# Patient Record
Sex: FEMALE | Race: WHITE | NOT HISPANIC OR LATINO | URBAN - NONMETROPOLITAN AREA
[De-identification: names, ages, dates, MRNs, and addresses within clinical notes are randomized per-mention and may not be internally consistent; named-entity substitution may affect disease eponyms.]

---

## 2018-01-18 ENCOUNTER — OFFICE VISIT CONVERTED (OUTPATIENT)
Dept: FAMILY MEDICINE CLINIC | Facility: CLINIC | Age: 38
End: 2018-01-18
Attending: NURSE PRACTITIONER

## 2018-06-29 ENCOUNTER — OFFICE VISIT CONVERTED (OUTPATIENT)
Dept: FAMILY MEDICINE CLINIC | Facility: CLINIC | Age: 38
End: 2018-06-29
Attending: NURSE PRACTITIONER

## 2019-06-11 ENCOUNTER — OFFICE VISIT CONVERTED (OUTPATIENT)
Dept: FAMILY MEDICINE CLINIC | Facility: CLINIC | Age: 39
End: 2019-06-11
Attending: FAMILY MEDICINE

## 2019-06-11 ENCOUNTER — HOSPITAL ENCOUNTER (OUTPATIENT)
Dept: GENERAL RADIOLOGY | Facility: HOSPITAL | Age: 39
Discharge: HOME | End: 2019-06-11
Attending: FAMILY MEDICINE

## 2019-06-21 ENCOUNTER — OFFICE VISIT CONVERTED (OUTPATIENT)
Dept: FAMILY MEDICINE CLINIC | Facility: CLINIC | Age: 39
End: 2019-06-21
Attending: NURSE PRACTITIONER

## 2019-06-21 ENCOUNTER — HOSPITAL ENCOUNTER (OUTPATIENT)
Dept: GENERAL RADIOLOGY | Facility: HOSPITAL | Age: 39
Discharge: HOME | End: 2019-06-21
Attending: NURSE PRACTITIONER

## 2019-06-21 LAB
25(OH)D3 SERPL-MCNC: 8.9 NG/ML (ref 30–100)
ALBUMIN SERPL-MCNC: 3.2 G/DL (ref 3.5–5)
ALBUMIN/GLOB SERPL: 0.8 {RATIO} (ref 1.4–2.6)
ALP SERPL-CCNC: 73 U/L (ref 42–98)
ALT SERPL-CCNC: 21 U/L (ref 10–40)
ANION GAP SERPL CALC-SCNC: 18 MMOL/L (ref 8–19)
AST SERPL-CCNC: 34 U/L (ref 15–50)
BASOPHILS # BLD AUTO: 0.11 10*3/UL (ref 0–0.2)
BASOPHILS NFR BLD AUTO: 0.9 % (ref 0–3)
BILIRUB SERPL-MCNC: 0.57 MG/DL (ref 0.2–1.3)
BUN SERPL-MCNC: 16 MG/DL (ref 5–25)
BUN/CREAT SERPL: 11 {RATIO} (ref 6–20)
CALCIUM SERPL-MCNC: 9.3 MG/DL (ref 8.7–10.4)
CHLORIDE SERPL-SCNC: 100 MMOL/L (ref 99–111)
CHOLEST SERPL-MCNC: 130 MG/DL (ref 107–200)
CHOLEST/HDLC SERPL: 3.9 {RATIO} (ref 3–6)
CONV ABS IMM GRAN: 0.04 10*3/UL (ref 0–0.2)
CONV CO2: 24 MMOL/L (ref 22–32)
CONV IMMATURE GRAN: 0.3 % (ref 0–1.8)
CONV TOTAL PROTEIN: 7.3 G/DL (ref 6.3–8.2)
CREAT UR-MCNC: 1.46 MG/DL (ref 0.5–0.9)
DEPRECATED RDW RBC AUTO: 51.2 FL (ref 36.4–46.3)
EOSINOPHIL # BLD AUTO: 0.21 10*3/UL (ref 0–0.7)
EOSINOPHIL # BLD AUTO: 1.8 % (ref 0–7)
ERYTHROCYTE [DISTWIDTH] IN BLOOD BY AUTOMATED COUNT: 17.9 % (ref 11.7–14.4)
EST. AVERAGE GLUCOSE BLD GHB EST-MCNC: 108 MG/DL
FOLATE SERPL-MCNC: 8.4 NG/ML (ref 4.8–20)
GFR SERPLBLD BASED ON 1.73 SQ M-ARVRAT: 45 ML/MIN/{1.73_M2}
GLOBULIN UR ELPH-MCNC: 4.1 G/DL (ref 2–3.5)
GLUCOSE SERPL-MCNC: 119 MG/DL (ref 65–99)
HBA1C MFR BLD: 10.5 G/DL (ref 12–16)
HBA1C MFR BLD: 5.4 % (ref 3.5–5.7)
HCT VFR BLD AUTO: 35.5 % (ref 37–47)
HDLC SERPL-MCNC: 33 MG/DL (ref 40–60)
IRON SATN MFR SERPL: 6 % (ref 20–55)
IRON SERPL-MCNC: 24 UG/DL (ref 60–170)
LDLC SERPL CALC-MCNC: 72 MG/DL (ref 70–100)
LYMPHOCYTES # BLD AUTO: 1.4 10*3/UL (ref 1–5)
MCH RBC QN AUTO: 23.4 PG (ref 27–31)
MCHC RBC AUTO-ENTMCNC: 29.6 G/DL (ref 33–37)
MCV RBC AUTO: 79.2 FL (ref 81–99)
MONOCYTES # BLD AUTO: 1.51 10*3/UL (ref 0.2–1.2)
MONOCYTES NFR BLD AUTO: 12.9 % (ref 3–10)
NEUTROPHILS # BLD AUTO: 8.45 10*3/UL (ref 2–8)
NEUTROPHILS NFR BLD AUTO: 72.2 % (ref 30–85)
NRBC CBCN: 0 % (ref 0–0.7)
OSMOLALITY SERPL CALC.SUM OF ELEC: 288 MOSM/KG (ref 273–304)
PLATELET # BLD AUTO: 277 10*3/UL (ref 130–400)
PMV BLD AUTO: 10.8 FL (ref 9.4–12.3)
POTASSIUM SERPL-SCNC: 4.4 MMOL/L (ref 3.5–5.3)
RBC # BLD AUTO: 4.48 10*6/UL (ref 4.2–5.4)
SODIUM SERPL-SCNC: 138 MMOL/L (ref 135–147)
T4 FREE SERPL-MCNC: 1.2 NG/DL (ref 0.9–1.8)
TIBC SERPL-MCNC: 388 UG/DL (ref 245–450)
TRANSFERRIN SERPL-MCNC: 271 MG/DL (ref 250–380)
TRIGL SERPL-MCNC: 124 MG/DL (ref 40–150)
TSH SERPL-ACNC: 3.65 M[IU]/L (ref 0.27–4.2)
VARIANT LYMPHS NFR BLD MANUAL: 11.9 % (ref 20–45)
VIT B12 SERPL-MCNC: 372 PG/ML (ref 211–911)
VLDLC SERPL-MCNC: 25 MG/DL (ref 5–37)
WBC # BLD AUTO: 11.72 10*3/UL (ref 4.8–10.8)

## 2019-07-23 ENCOUNTER — OFFICE VISIT CONVERTED (OUTPATIENT)
Dept: FAMILY MEDICINE CLINIC | Facility: CLINIC | Age: 39
End: 2019-07-23
Attending: NURSE PRACTITIONER

## 2019-09-10 ENCOUNTER — OFFICE VISIT CONVERTED (OUTPATIENT)
Dept: FAMILY MEDICINE CLINIC | Facility: CLINIC | Age: 39
End: 2019-09-10
Attending: NURSE PRACTITIONER

## 2019-11-21 ENCOUNTER — OFFICE VISIT CONVERTED (OUTPATIENT)
Dept: FAMILY MEDICINE CLINIC | Facility: CLINIC | Age: 39
End: 2019-11-21
Attending: FAMILY MEDICINE

## 2019-12-04 ENCOUNTER — OFFICE VISIT CONVERTED (OUTPATIENT)
Dept: FAMILY MEDICINE CLINIC | Facility: CLINIC | Age: 39
End: 2019-12-04
Attending: NURSE PRACTITIONER

## 2019-12-04 ENCOUNTER — CONVERSION ENCOUNTER (OUTPATIENT)
Dept: FAMILY MEDICINE CLINIC | Facility: CLINIC | Age: 39
End: 2019-12-04

## 2020-02-05 ENCOUNTER — CONVERSION ENCOUNTER (OUTPATIENT)
Dept: FAMILY MEDICINE CLINIC | Facility: CLINIC | Age: 40
End: 2020-02-05

## 2020-02-05 ENCOUNTER — OFFICE VISIT CONVERTED (OUTPATIENT)
Dept: FAMILY MEDICINE CLINIC | Facility: CLINIC | Age: 40
End: 2020-02-05
Attending: NURSE PRACTITIONER

## 2020-11-23 ENCOUNTER — TELEMEDICINE CONVERTED (OUTPATIENT)
Dept: FAMILY MEDICINE CLINIC | Facility: CLINIC | Age: 40
End: 2020-11-23
Attending: NURSE PRACTITIONER

## 2021-02-02 ENCOUNTER — TELEPHONE CONVERTED (OUTPATIENT)
Dept: FAMILY MEDICINE CLINIC | Facility: CLINIC | Age: 41
End: 2021-02-02
Attending: NURSE PRACTITIONER

## 2021-05-13 NOTE — PROGRESS NOTES
"   Progress Note      Patient Name: Niesha Bravo   Patient ID: [id number]   Sex: Female   YOB: 1980    Primary Care Provider: Phani Plummer DO   Referring Provider: Phani Plummer DO    Visit Date: November 23, 2020    Provider: JESUS Lawrence   Location: [facility]   Location Address: [address] [place] [address]  [id number]   Location Phone: [phone]          Chief Complaint  · Cold sore, rash, blister on chin with drainage.      History Of Present Illness  Niesha Bravo is a 40 year old /White female who presents for evaluation and treatment of:   TELEHEALTH TELEPHONE VISIT  Niesha Bravo is a 40 year old /White female who is presenting for evaluation via telehealth telephone visit. Verbal consent obtained before beginning visit.   Provider spent 13 minutes with patient during telehealth visit.   The following staff were present during this visit: JESUS Lawrence   Past Medical History/Overview of Patient Symptoms     1 year ago. States recent difficulty sleeping. Sleeps approx. 5-7 hours per night. .\">Pt c/o cold sore outbreak x 4 days. Started with one blister on bottom lip, has now spread to a cluster of blisters on top lip. States a small amount of discharge. No previous treatment. Hx of cold sores. Denies fever, chills, HA, sore throat, cough, or other.     Pt request refill of Ambien. Takes sparingly. Last Rx >1 year ago. States recent difficulty sleeping. Sleeps approx. 5-7 hours per night. .       Past Medical History  Disease Name Date Onset Notes   Hyperlipidemia 06/18/2014 --    Hypertension 06/03/2014 --    Hypothyroidism 06/03/2014 --    Obesity 09/19/2014 --          Medication List  Name Date Started Instructions   30G x 8mm (1/3\") disposable needles 05/19/2017 to be used as needed with Saxenda injectable pen   Advair Diskus 250-50 mcg/dose inhalation blister with device 05/11/2020 inhale 1 puff by inhalation route 2 times " per day in the morning and evening approximately 12 hours apart   Aeroneb Go Nebulizer miscellaneous misc 12/04/2019 use as directed   albuterol sulfate 2.5 mg /3 mL (0.083 %) inhalation solution for nebulization 04/01/2020 NEB QID PRN   albuterol sulfate 90 mcg/actuation inhalation HFA aerosol inhaler 05/04/2020 2P PO Q4-6H PRN   Ambien 5 mg oral tablet 09/10/2019 take 1 tablet (5 mg) by oral route once daily at bedtime for 30 days   cetirizine 10 mg oral tablet 10/20/2020 TAKE 1 TABLET BY MOUTH ONCE DAILY   diclofenac sodium 50 mg oral tablet,delayed release (DR/EC) 05/05/2020 1T PO BID /F   Diovan -25 mg oral tablet 07/23/2019 take 1 tablet by oral route once daily for 30 days   ergocalciferol (vitamin D2) 1,250 mcg (50,000 unit) oral capsule 10/20/2020 TAKE 1 CAPSULE (50,000 UNIT) BY ORAL ROUTE ONCE WEEKLY   ferrous sulfate 325 mg (65 mg iron) oral tablet 05/04/2020 1T PO BID   fluticasone propion-salmeterol 250-50 mcg/dose inhalation blister with device 10/20/2020 ONE PUFF BY MOUTH TWICE DAILY EVERY MORNING & EVERY EVENING   fluticasone propionate 0.005 % topical ointment 07/09/2020 apply a thin layer to the affected area(s) by topical route 2 times per day ; rub in gently and completely   ipratropium-albuterol 0.5 mg-3 mg(2.5 mg base)/3 mL inhalation solution for nebulization 12/04/2019 USE ONE VIAL IN THE NEBULIZER TWICE DAILY FOR7 DAYS   labetalol 200 mg oral tablet 05/04/2020 1T PO BID   levothyroxine 150 mcg oral tablet 05/04/2020 1T PO QAM OES   nystatin 100,000 unit/mL oral suspension 06/12/2020 1TSP PO QID   Reusable Nebulizer Kit miscellaneous kit 09/10/2019 use as directed   ropinirole 1 mg oral tablet 02/05/2020 take 1 tablet (1 mg) by oral route 1-3 hours before bedtime for 30 days   valsartan-hydrochlorothiazide 320-25 mg oral tablet 04/01/2020 1T PO QD   Ventolin HFA 90 mcg/actuation inhalation HFA aerosol inhaler 07/23/2019 INHALE 2 PUFFS BY MOUTH EVERY 4 TO 6 HOURS AS NEEDED for 30 days    Vitamin D2 50,000 unit oral capsule 10/14/2019 take 1 capsule (50,000 unit) by oral route once weekly for 30 days         Allergy List  Allergen Name Date Reaction Notes   NO KNOWN DRUG ALLERGIES --  --  --        Allergies Reconciled  Social History  Finding Status Start/Stop Quantity Notes   Active but no formal exercise --  --/-- --  --    Alcohol Never --/-- --  --    Denies illicit substance abuse --  --/-- --  --    Denies substance abuse --  --/-- --  --    Tobacco Never --/-- --  --          Review of Systems  · Constitutional  o Admits  o : insomnia  o Denies  o : fever, fatigue, weight loss, weight gain  · HENT  o Admits  o : oral lesions  · Cardiovascular  o Denies  o : lower extremity edema, claudication, chest pressure, palpitations  · Respiratory  o Denies  o : shortness of breath, wheezing, cough, hemoptysis, dyspnea on exertion  · Gastrointestinal  o Denies  o : nausea, vomiting, diarrhea, constipation, abdominal pain  · Psychiatric  o Denies  o : anxiety, depression, suicidal ideation, homicidal ideation          Assessment  · Insomnia, unspecified     780.52/G47.00  · Oral lesion     528.9/K13.70  · Herpes simplex type 1 infection     054.9/B00.9       HSV-1 outbreak:  acyclovir 200mg PO 5x per day x 5 days  mupirocin ointment apply bid prn   Keep area clean and dry  Tyl/Ibu q4-6hrs UAD prn     Insomnia:  Refilled Ambien 5mg PO qhs PRN  Constantine reviewed and appropriate  Discussed all med SE/AEs including all BB warnings, pt will DC immed and notify office if these occur  Discussed proper sleep hygiene       Plan  · Orders  o CONSTANTINE Report (KASPR) - - 11/23/2020  o ACO-39: Current medications updated and reviewed (, 8421F) - - 11/23/2020  o Physician Telephone Evaluation, 11-20 minutes (42042) - - 11/23/2020  · Medications  o acyclovir 200 mg oral capsule   SIG: take 1 capsule (200 mg) by oral route 5 times per day for 5 days   DISP: (25) Capsule with 0 refills  Prescribed on 11/23/2020      o mupirocin 2 % topical ointment   SIG: apply a small amount to the affected area by topical route 3 times per day   DISP: (1) Tube with 0 refills  Prescribed on 11/23/2020     o Ambien 5 mg oral tablet   SIG: take 1 tablet (5 mg) by oral route once daily at bedtime for 30 days   DISP: (30) Tablet with 1 refills  Refilled on 11/23/2020     o Medications have been Reconciled  o Transition of Care or Provider Policy  · Instructions  o Avoid any electronic use for at least 30 minutes prior to bed time. Cell phone screens, tablets and TVs imitate daylight, so your brain can become confused on the time of day. No caffeine use in the late afternoon and evenings.  o Obtained a written consent for CONSTANTINE query. Discussed the risk and benefits of the use of controlled substances with the patient, including the risk of tolerance and drug dependence. The patient has been counseled on the need to have an exit strategy, including potentially discontinuing the use of controlled substances. CONSTANTINE has or will be reviewed as soon as it becomes avaliable.  o Take all medications as prescribed/directed.  o Rest. Increase Fluids.  o Patient was educated/instructed on their diagnosis, treatment and medications prior to discharge from the clinic today.  o Patient instructed to seek medical attention urgently for new or worsening symptoms.  o Call the office with any concerns or questions.  o Bring all medicines with their bottles to each office visit.  o Risks, benefits, and alternatives were discussed with the patient. The patient is aware of risks associated with: all meds and conditions.   o Chronic conditions reviewed and taken into consideration for today's treatment plan.  o Plan Of Care: HSV-1  · Disposition  o Call or Return if symptoms worsen or persist.  o Follow Up PRN.  o Proceed to ED for all medical emergencies.            Electronically Signed by: JESUS Lawrence -Author on November 23, 2020 12:06:07 PM

## 2021-05-14 NOTE — PROGRESS NOTES
"   Progress Note      Patient Name: Niesha Bravo   Patient ID: [id number]   Sex: Female   YOB: 1980    Primary Care Provider: Phani Plummer DO   Referring Provider: Phani Plummer DO    Visit Date: February 2, 2021    Provider: JESUS Lawrence   Location: [facility]   Location Address: [address] [place] [address]  [id number]   Location Phone: [phone]          Chief Complaint  · Coughing x3 days.  · Hurts when taking a deep breath.      History Of Present Illness  TELEHEALTH TELEPHONE VISIT  Niesha Bravo is a 40 year old /White female who is presenting for evaluation via telehealth telephone visit. Verbal consent obtained before beginning visit.   Provider spent 14 minutes with patient during telehealth visit.   The following staff were present during this visit: JESUS Lawrence   Past Medical History/Overview of Patient Symptoms  Niesha Bravo is a 40 year old /White female who presents for evaluation and treatment of:      Pt c/o productive cough, nasal congestion (yellow, green), chest congestion x 5 days. Denies fever, chills, sore throat, SOB, myalgia, sudden loss of taste or smell, HA. Denies any recent travel or sick contacts. Has not taken anything to treat.       Past Medical History  Disease Name Date Onset Notes   Hyperlipidemia 06/18/2014 --    Hypertension 06/03/2014 --    Hypothyroidism 06/03/2014 --    Obesity 09/19/2014 --          Medication List  Name Date Started Instructions   30G x 8mm (1/3\") disposable needles 05/19/2017 to be used as needed with Saxenda injectable pen   acyclovir 200 mg oral capsule 11/24/2020 take 1 capsule (200 mg) by oral route 5 times per day for 5 days   Advair Diskus 250-50 mcg/dose inhalation blister with device 05/11/2020 inhale 1 puff by inhalation route 2 times per day in the morning and evening approximately 12 hours apart   Aeroneb Go Nebulizer miscellaneous misc 12/04/2019 use as directed "   albuterol sulfate 2.5 mg /3 mL (0.083 %) inhalation solution for nebulization 04/01/2020 NEB QID PRN   albuterol sulfate 90 mcg/actuation inhalation HFA aerosol inhaler 05/04/2020 2P PO Q4-6H PRN   Ambien 5 mg oral tablet 11/23/2020 take 1 tablet (5 mg) by oral route once daily at bedtime for 30 days   cetirizine 10 mg oral tablet 10/20/2020 TAKE 1 TABLET BY MOUTH ONCE DAILY   diclofenac sodium 50 mg oral tablet,delayed release (DR/EC) 05/05/2020 1T PO BID /F   Diovan -25 mg oral tablet 07/23/2019 take 1 tablet by oral route once daily for 30 days   ergocalciferol (vitamin D2) 1,250 mcg (50,000 unit) oral capsule 10/20/2020 TAKE 1 CAPSULE (50,000 UNIT) BY ORAL ROUTE ONCE WEEKLY   ferrous sulfate 325 mg (65 mg iron) oral tablet 12/09/2020 TAKE 1 TABLET BY MOUTH TWICE DAILY   fluticasone propion-salmeterol 250-50 mcg/dose inhalation blister with device 10/20/2020 ONE PUFF BY MOUTH TWICE DAILY EVERY MORNING & EVERY EVENING   fluticasone propionate 0.005 % topical ointment 07/09/2020 apply a thin layer to the affected area(s) by topical route 2 times per day ; rub in gently and completely   ipratropium-albuterol 0.5 mg-3 mg(2.5 mg base)/3 mL inhalation solution for nebulization 12/04/2019 USE ONE VIAL IN THE NEBULIZER TWICE DAILY FOR7 DAYS   labetalol 200 mg oral tablet 12/09/2020 TAKE 1 TABLET BY MOUTH TWICE DAILY   levothyroxine 150 mcg oral tablet 12/09/2020 TAKE 1 TABLET BY MOUTH EVERY MORNING TAKE ON EMPTY STOMACH   mupirocin 2 % topical ointment 11/24/2020 apply a small amount to the affected area by topical route 3 times per day   nystatin 100,000 unit/mL oral suspension 06/12/2020 1TSP PO QID   Reusable Nebulizer Kit miscellaneous kit 09/10/2019 use as directed   ropinirole 1 mg oral tablet 12/09/2020 TAKE 1 TABLET BY MOUTH ONE TO THREE HOURS BEFORE BEDTIME   valsartan-hydrochlorothiazide 320-25 mg oral tablet 12/09/2020 TAKE 1 TABLET BY MOUTH ONCE DAILY   Ventolin HFA 90 mcg/actuation inhalation HFA  aerosol inhaler 02/02/2021 INHALE 2 PUFFS BY MOUTH EVERY 4 TO 6 HOURS AS NEEDED for 30 days   Vitamin D2 1,250 mcg (50,000 unit) oral capsule 02/02/2021 take 1 capsule (50,000 unit) by oral route once weekly for 30 days for 30 days         Allergy List  Allergen Name Date Reaction Notes   NO KNOWN DRUG ALLERGIES --  --  --        Allergies Reconciled  Social History  Finding Status Start/Stop Quantity Notes   Active but no formal exercise --  --/-- --  --    Alcohol Never --/-- --  --    Denies illicit substance abuse --  --/-- --  --    Denies substance abuse --  --/-- --  --    Tobacco Never --/-- --  --          Review of Systems  · Constitutional  o Denies  o : fever, fatigue, weight loss, weight gain  · HENT  o Admits  o : nasal congestion, nasal discharge  o Denies  o : headaches, sore throat  · Cardiovascular  o Denies  o : lower extremity edema, claudication, chest pressure, palpitations  · Respiratory  o Admits  o : cough, chest congestion  o Denies  o : shortness of breath, wheezing, hemoptysis, dyspnea on exertion  · Gastrointestinal  o Denies  o : nausea, vomiting, diarrhea, constipation, abdominal pain  · Genitourinary  o Admits  o : incontinence  · Musculoskeletal  o Denies  o : muscle pain, back pain  · Psychiatric  o Denies  o : anxiety, depression, suicidal ideation, homicidal ideation              Assessment  · Allergic rhinitis due to allergen     477.9/J30.9  · Bronchitis, acute     466.0/J20.9  · Cough     786.2/R05  · Essential hypertension     401.9/I10  · Obesity     278.00/E66.9  · Incontinence     788.30/R32       Bronchitis, acute:  Augmentin 875mg PO bid x 7 days  Medrol dose Kwan UAD   Increase rest  Increase clear fluids  Continue all inhalers, neb treatments, and medications as prescribed  Avoid allergy/bronchitis triggers    Incontinence:  Discussed behavioral treatments including bladder retraining and prompted voiding as well as kegal exercises   advised weight loss, caffeine  reductions, fluid mgt  Start Oxybutinin 5mg PO bid   Order for adult diapers at patient request  Advised to referral to urology if S/S worsen or persist despite tx     Problems Reconciled  Plan  · Orders  o Physician Telephone Evaluation, 11-20 minutes (00372) - - 02/02/2021  o ACO-39: Current medications updated and reviewed (, 1159F) - - 02/02/2021  · Medications  o Augmentin 875-125 mg oral tablet   SIG: take 1 tablet by oral route every 12 hours for 7 days   DISP: (14) Tablet with 0 refills  Prescribed on 02/02/2021     o Medrol (Kwan) 4 mg oral tablets,dose pack   SIG: take as directed   DISP: (1) Packet with 0 refills  Prescribed on 02/02/2021     o Depend Underwear For Women XL miscellaneous misc   SIG: use as directed   DISP: (2) Package with 2 refills  Prescribed on 02/02/2021     o oxybutynin chloride 5 mg oral tablet   SIG: take 1 tablet (5 mg) by oral route 2 times per day for 30 days   DISP: (60) Tablet with 2 refills  Prescribed on 02/02/2021     o Medications have been Reconciled  o Transition of Care or Provider Policy  · Instructions  o Patient advised to monitor blood pressure (B/P) at home and journal readings. Patient informed that a B/P reading at home of more than 130/80 is considered hypertension. For readings greater eqrq344/90 or higher patient is advised to follow up in the office with readings for management. Patient advised to limit sodium intake.  o Plan Of Care: bronchitis, incontinence   o Chronic conditions reviewed and taken into consideration for today's treatment plan.  o Patient instructed to seek medical attention urgently for new or worsening symptoms.  o Patient was educated/instructed on their diagnosis, treatment and medications prior to discharge from the clinic today.  o Take all medications as prescribed/directed.  o Rest. Increase Fluids.  o Patient was instructed to exercise regularly.  o Call the office with any concerns or questions.  o Risks, benefits, and  alternatives were discussed with the patient. The patient is aware of risks associated with: all med and conditions.   o Discussed Covid-19 precautions including, but not limited to, social distancing, avoid touching your face, and hand washing.   o Bring all medicines with their bottles to each office visit.  · Disposition  o Call or Return if symptoms worsen or persist.  o Follow Up PRN.  o Proceed to ED for all medical emergencies.            Electronically Signed by: JESUS Lawrence -Author on February 2, 2021 01:45:18 PM

## 2021-05-15 VITALS
TEMPERATURE: 94.3 F | HEIGHT: 66 IN | OXYGEN SATURATION: 94 % | DIASTOLIC BLOOD PRESSURE: 39 MMHG | HEART RATE: 74 BPM | WEIGHT: 293 LBS | BODY MASS INDEX: 47.09 KG/M2 | SYSTOLIC BLOOD PRESSURE: 63 MMHG | RESPIRATION RATE: 16 BRPM

## 2021-05-15 VITALS
RESPIRATION RATE: 20 BRPM | OXYGEN SATURATION: 97 % | DIASTOLIC BLOOD PRESSURE: 95 MMHG | WEIGHT: 293 LBS | SYSTOLIC BLOOD PRESSURE: 188 MMHG | HEIGHT: 66 IN | BODY MASS INDEX: 47.09 KG/M2 | HEART RATE: 98 BPM | TEMPERATURE: 98 F

## 2021-05-15 VITALS
DIASTOLIC BLOOD PRESSURE: 79 MMHG | HEART RATE: 83 BPM | SYSTOLIC BLOOD PRESSURE: 124 MMHG | OXYGEN SATURATION: 97 % | RESPIRATION RATE: 20 BRPM | WEIGHT: 293 LBS | TEMPERATURE: 97.1 F | HEIGHT: 66 IN | BODY MASS INDEX: 47.09 KG/M2

## 2021-05-15 VITALS
OXYGEN SATURATION: 96 % | SYSTOLIC BLOOD PRESSURE: 101 MMHG | TEMPERATURE: 98.4 F | HEIGHT: 66 IN | BODY MASS INDEX: 47.09 KG/M2 | HEART RATE: 106 BPM | RESPIRATION RATE: 18 BRPM | WEIGHT: 293 LBS | DIASTOLIC BLOOD PRESSURE: 62 MMHG

## 2021-05-15 VITALS
DIASTOLIC BLOOD PRESSURE: 68 MMHG | TEMPERATURE: 97.9 F | RESPIRATION RATE: 20 BRPM | HEIGHT: 66 IN | SYSTOLIC BLOOD PRESSURE: 130 MMHG | HEART RATE: 90 BPM | WEIGHT: 293 LBS | OXYGEN SATURATION: 96 % | BODY MASS INDEX: 47.09 KG/M2

## 2021-05-15 VITALS
RESPIRATION RATE: 20 BRPM | HEART RATE: 87 BPM | OXYGEN SATURATION: 96 % | SYSTOLIC BLOOD PRESSURE: 106 MMHG | DIASTOLIC BLOOD PRESSURE: 72 MMHG | BODY MASS INDEX: 47.09 KG/M2 | HEIGHT: 66 IN | WEIGHT: 293 LBS | TEMPERATURE: 97.3 F

## 2021-05-15 VITALS
BODY MASS INDEX: 47.09 KG/M2 | HEART RATE: 68 BPM | WEIGHT: 293 LBS | TEMPERATURE: 97.5 F | DIASTOLIC BLOOD PRESSURE: 102 MMHG | RESPIRATION RATE: 20 BRPM | HEIGHT: 66 IN | SYSTOLIC BLOOD PRESSURE: 188 MMHG | OXYGEN SATURATION: 96 %

## 2021-05-16 VITALS
RESPIRATION RATE: 20 BRPM | SYSTOLIC BLOOD PRESSURE: 146 MMHG | OXYGEN SATURATION: 95 % | BODY MASS INDEX: 47.09 KG/M2 | TEMPERATURE: 97.1 F | HEIGHT: 66 IN | WEIGHT: 293 LBS | DIASTOLIC BLOOD PRESSURE: 89 MMHG | HEART RATE: 82 BPM

## 2021-05-16 VITALS
HEIGHT: 66 IN | SYSTOLIC BLOOD PRESSURE: 140 MMHG | DIASTOLIC BLOOD PRESSURE: 68 MMHG | BODY MASS INDEX: 47.09 KG/M2 | TEMPERATURE: 97.7 F | HEART RATE: 100 BPM | RESPIRATION RATE: 28 BRPM | OXYGEN SATURATION: 94 % | WEIGHT: 293 LBS

## 2021-05-19 ENCOUNTER — TELEMEDICINE CONVERTED (OUTPATIENT)
Dept: FAMILY MEDICINE CLINIC | Facility: CLINIC | Age: 41
End: 2021-05-19
Attending: NURSE PRACTITIONER

## 2021-06-05 NOTE — PROGRESS NOTES
"   Progress Note      Patient Name: Niesha Bravo   Patient ID: [id number]   Sex: Female   YOB: 1980    Primary Care Provider: Phani Plummer DO   Referring Provider: Phani Plummer DO    Visit Date: May 19, 2021    Provider: JESUS Lawrence   Location: [facility]   Location Address: [address] [place] [address]  [id number]   Location Phone: [phone]          Chief Complaint     patient being seen for cough/congestion       History Of Present Illness  Video Conferencing Visit  Niesha Bravo is a 41 year old /White female who is presenting for evaluation via video conferencing via Zoom. Verbal consent obtained before beginning visit.   The following staff were present during this visit: JESUS Lawrence, Alesia Juarez   Niesha Bravo is a 41 year old /White female who presents for evaluation and treatment of:      Pt presents with productive cough (yellow), nasal congestion, chest congestion, HA x 1 week. Has not taken anything to treat.       Past Medical History  Disease Name Date Onset Notes   Hyperlipidemia 06/18/2014 --    Hypertension 06/03/2014 --    Hypothyroidism 06/03/2014 --    Obesity 09/19/2014 --          Medication List  Name Date Started Instructions   30G x 8mm (1/3\") disposable needles 05/19/2017 to be used as needed with Saxenda injectable pen   acyclovir 200 mg oral capsule 03/30/2021 take 1 capsule (200 mg) by oral route 5 times per day for 5 days   Advair Diskus 250-50 mcg/dose inhalation blister with device 03/30/2021 inhale 1 puff by inhalation route 2 times per day in the morning and evening approximately 12 hours apart for 30 days   Aeroneb Go Nebulizer miscellaneous misc 12/04/2019 use as directed   albuterol sulfate 2.5 mg /3 mL (0.083 %) inhalation solution for nebulization 04/01/2020 NEB QID PRN   albuterol sulfate 90 mcg/actuation inhalation HFA aerosol inhaler 03/30/2021 2P PO Q4-6H PRN for 30 days   Ambien 5 mg oral " tablet 11/23/2020 take 1 tablet (5 mg) by oral route once daily at bedtime for 30 days   cetirizine 10 mg oral tablet 03/30/2021 TAKE 1 TABLET BY MOUTH ONCE DAILY   Depend Underwear For Women XL miscellaneous misc 03/30/2021 use as directed for 30 days   diclofenac sodium 50 mg oral tablet,delayed release (DR/EC) 03/30/2021 1T PO BID /F for 30 days   Diovan -25 mg oral tablet 03/30/2021 take 1 tablet by oral route once daily for 30 days   ergocalciferol (vitamin D2) 1,250 mcg (50,000 unit) oral capsule 10/20/2020 TAKE 1 CAPSULE (50,000 UNIT) BY ORAL ROUTE ONCE WEEKLY   ferrous sulfate 325 mg (65 mg iron) oral tablet 03/30/2021 TAKE 1 TABLET BY MOUTH TWICE DAILY   fluticasone propion-salmeterol 250-50 mcg/dose inhalation blister with device 03/30/2021 ONE PUFF BY MOUTH TWICE DAILY EVERY MORNING & EVERY EVENING   fluticasone propionate 0.005 % topical ointment 07/09/2020 apply a thin layer to the affected area(s) by topical route 2 times per day ; rub in gently and completely   ipratropium-albuterol 0.5 mg-3 mg(2.5 mg base)/3 mL inhalation solution for nebulization 03/30/2021 USE ONE VIAL IN THE NEBULIZER TWICE DAILY FOR7 DAYS for 30 days   labetalol 200 mg oral tablet 03/30/2021 TAKE 1 TABLET BY MOUTH TWICE DAILY   levothyroxine 150 mcg oral tablet 03/30/2021 TAKE 1 TABLET BY MOUTH EVERY MORNING TAKE ON EMPTY STOMACH   mupirocin 2 % topical ointment 11/24/2020 apply a small amount to the affected area by topical route 3 times per day   nystatin 100,000 unit/mL oral suspension 06/12/2020 1TSP PO QID   oxybutynin chloride 5 mg oral tablet 03/30/2021 take 1 tablet (5 mg) by oral route 2 times per day for 30 days   Reusable Nebulizer Kit miscellaneous kit 09/10/2019 use as directed   ropinirole 1 mg oral tablet 03/30/2021 TAKE 1 TABLET BY MOUTH ONE TO THREE HOURS BEFORE BEDTIME   valsartan-hydrochlorothiazide 320-25 mg oral tablet 03/30/2021 TAKE 1 TABLET BY MOUTH ONCE DAILY   Ventolin HFA 90 mcg/actuation  inhalation HFA aerosol inhaler 03/30/2021 INHALE 2 PUFFS BY MOUTH EVERY 4 TO 6 HOURS AS NEEDED for 30 days   Vitamin D2 1,250 mcg (50,000 unit) oral capsule 03/30/2021 take 1 capsule (50,000 unit) by oral route once weekly for 30 days for 30 days         Allergy List  Allergen Name Date Reaction Notes   NO KNOWN DRUG ALLERGIES --  --  --          Social History  Finding Status Start/Stop Quantity Notes   Active but no formal exercise --  --/-- --  --    Alcohol Never --/-- --  --    Denies illicit substance abuse --  --/-- --  --    Denies substance abuse --  --/-- --  --    Tobacco Never --/-- --  --          Review of Systems  · Constitutional  o Denies  o : fever, fatigue, weight loss, weight gain  · HENT  o Admits  o : headaches, nasal congestion, nasal discharge  · Cardiovascular  o Denies  o : lower extremity edema, claudication, chest pressure, palpitations  · Respiratory  o Admits  o : cough  o Denies  o : shortness of breath, wheezing, hemoptysis, dyspnea on exertion  · Gastrointestinal  o Denies  o : nausea, vomiting, diarrhea, constipation, abdominal pain  · Musculoskeletal  o Denies  o : muscle pain, back pain  · Psychiatric  o Denies  o : anxiety, depression, suicidal ideation, homicidal ideation      Physical Examination  · Constitutional  o Appearance  o : no acute distress, well-nourished  · Head and Face  o Head  o :   § Inspection  § : atraumatic, normocephalic  o Face  o :   § Inspection  § : no facial lesions  · Eyes  o Eyes  o : no scleral icterus, no conjunctival injection  · Respiratory  o Respiratory  o : breathing comfortably, symmetric chest rise  · Psychiatric  o General  o : normal mood and affect          Assessment  · Allergic rhinitis due to allergen     477.9/J30.9  · Bronchitis, acute     466.0/J20.9  · Obesity     278.00/E66.9       Bronchitis, acute:  cefdinir 300mg PO bid  7 days  Medrol dose vy UAD   Bromfed 10ml PO q4hrs PRN cough  Increase rest  Increase clear fluids  Avoid  allergy triggers       Plan  · Orders  o ACO-39: Current medications updated and reviewed (1159F, ) - - 05/19/2021  · Medications  o cefdinir 300 mg oral capsule   SIG: take 1 capsule (300 mg) by oral route every 12 hours for 7 days   DISP: (14) Capsule with 0 refills  Prescribed on 05/19/2021     o Medrol (Kwan) 4 mg oral tablets,dose pack   SIG: take by oral route as directed per package instructions   DISP: (1) Packet with 0 refills  Prescribed on 05/19/2021     o Bromfed DM 2-30-10 mg/5 mL oral syrup   SIG: take 10 milliliters by oral route every 4 hours   DISP: (118) Milliliter with 0 refills  Prescribed on 05/19/2021     o Medications have been Reconciled  o Transition of Care or Provider Policy  · Instructions  o Take all medications as prescribed/directed.  o Rest. Increase Fluids.  o Patient was educated/instructed on their diagnosis, treatment and medications prior to discharge from the clinic today.  o Patient instructed to seek medical attention urgently for new or worsening symptoms.  o Call the office with any concerns or questions.  o Bring all medicines with their bottles to each office visit.  o Risks, benefits, and alternatives were discussed with the patient. The patient is aware of risks associated with: all meds and conditions.   o Chronic conditions reviewed and taken into consideration for today's treatment plan.  o Discussed Covid-19 precautions including, but not limited to, social distancing, avoid touching your face, and hand washing.   · Disposition  o Call or Return if symptoms worsen or persist.  o Follow Up PRN.  o Proceed to ED for all medical emergencies.            Electronically Signed by: JESUS Lawrence -Author on May 19, 2021 05:13:38 PM

## 2022-03-30 ENCOUNTER — TELEMEDICINE (OUTPATIENT)
Dept: FAMILY MEDICINE CLINIC | Facility: CLINIC | Age: 42
End: 2022-03-30

## 2022-03-30 VITALS — HEIGHT: 66 IN | BODY MASS INDEX: 62.21 KG/M2

## 2022-03-30 DIAGNOSIS — Z13.1 SCREENING FOR DIABETES MELLITUS: ICD-10-CM

## 2022-03-30 DIAGNOSIS — E66.01 CLASS 3 SEVERE OBESITY WITH SERIOUS COMORBIDITY IN ADULT, UNSPECIFIED BMI, UNSPECIFIED OBESITY TYPE: ICD-10-CM

## 2022-03-30 DIAGNOSIS — I10 HYPERTENSION, UNSPECIFIED TYPE: ICD-10-CM

## 2022-03-30 DIAGNOSIS — J30.9 ALLERGIC RHINITIS, UNSPECIFIED SEASONALITY, UNSPECIFIED TRIGGER: ICD-10-CM

## 2022-03-30 DIAGNOSIS — E78.5 HYPERLIPIDEMIA, UNSPECIFIED HYPERLIPIDEMIA TYPE: ICD-10-CM

## 2022-03-30 DIAGNOSIS — R60.9 EDEMA, UNSPECIFIED TYPE: ICD-10-CM

## 2022-03-30 DIAGNOSIS — E55.9 VITAMIN D DEFICIENCY: ICD-10-CM

## 2022-03-30 DIAGNOSIS — D64.9 ANEMIA, UNSPECIFIED TYPE: ICD-10-CM

## 2022-03-30 DIAGNOSIS — Z11.59 NEED FOR HEPATITIS C SCREENING TEST: Primary | ICD-10-CM

## 2022-03-30 DIAGNOSIS — E03.9 HYPOTHYROIDISM, UNSPECIFIED TYPE: ICD-10-CM

## 2022-03-30 PROCEDURE — 99214 OFFICE O/P EST MOD 30 MIN: CPT | Performed by: NURSE PRACTITIONER

## 2022-03-30 RX ORDER — ROPINIROLE 1 MG/1
TABLET, FILM COATED ORAL
COMMUNITY
Start: 2022-03-18

## 2022-03-30 RX ORDER — LABETALOL 100 MG/1
200 TABLET, FILM COATED ORAL 2 TIMES DAILY
COMMUNITY
Start: 2022-03-18 | End: 2022-09-01 | Stop reason: SDUPTHER

## 2022-03-30 RX ORDER — ERGOCALCIFEROL 1.25 MG/1
50000 CAPSULE ORAL WEEKLY
COMMUNITY
Start: 2022-03-18 | End: 2022-07-07 | Stop reason: SDUPTHER

## 2022-03-30 RX ORDER — CETIRIZINE HYDROCHLORIDE 10 MG/1
TABLET ORAL
COMMUNITY
Start: 2022-03-18 | End: 2022-07-07 | Stop reason: SDUPTHER

## 2022-03-30 RX ORDER — VALSARTAN AND HYDROCHLOROTHIAZIDE 320; 25 MG/1; MG/1
1 TABLET, FILM COATED ORAL DAILY
COMMUNITY
Start: 2022-03-18 | End: 2022-09-01 | Stop reason: SDUPTHER

## 2022-03-30 RX ORDER — IPRATROPIUM BROMIDE AND ALBUTEROL SULFATE 2.5; .5 MG/3ML; MG/3ML
SOLUTION RESPIRATORY (INHALATION)
COMMUNITY
Start: 2021-03-30

## 2022-03-30 RX ORDER — FUROSEMIDE 20 MG/1
20 TABLET ORAL DAILY PRN
Qty: 30 TABLET | Refills: 1 | Status: SHIPPED | OUTPATIENT
Start: 2022-03-30

## 2022-03-30 RX ORDER — FERROUS SULFATE TAB EC 324 MG (65 MG FE EQUIVALENT) 324 (65 FE) MG
324 TABLET DELAYED RESPONSE ORAL 2 TIMES DAILY
COMMUNITY
Start: 2022-03-18

## 2022-03-30 RX ORDER — LEVOTHYROXINE SODIUM 0.15 MG/1
TABLET ORAL
COMMUNITY
Start: 2022-03-18 | End: 2022-07-07 | Stop reason: SDUPTHER

## 2022-03-30 NOTE — ASSESSMENT & PLAN NOTE
Patient's (Body mass index is 62.21 kg/m².) indicates that they are morbidly obese (BMI > 40 or > 35 with obesity - related health condition) with health conditions that include hypertension and dyslipidemias . Weight is unchanged. BMI is is above average; BMI management plan is completed. We discussed low calorie, low carb based diet program, portion control and increasing exercise.

## 2022-03-30 NOTE — PATIENT INSTRUCTIONS
Obesity, Adult  Obesity is having too much body fat. Being obese means that your weight is more than what is healthy for you.  BMI is a number that explains how much body fat you have. If you have a BMI of 30 or more, you are obese. Obesity is often caused by eating or drinking more calories than your body uses. Changing your lifestyle can help you lose weight.  Obesity can cause serious health problems, such as:  Stroke.  Coronary artery disease (CAD).  Type 2 diabetes.  Some types of cancer, including cancers of the colon, breast, uterus, and gallbladder.  Osteoarthritis.  High blood pressure (hypertension).  High cholesterol.  Sleep apnea.  Gallbladder stones.  Infertility problems.  What are the causes?  Eating meals each day that are high in calories, sugar, and fat.  Being born with genes that may make you more likely to become obese.  Having a medical condition that causes obesity.  Taking certain medicines.  Sitting a lot (having a sedentary lifestyle).  Not getting enough sleep.  Drinking a lot of drinks that have sugar in them.  What increases the risk?  Having a family history of obesity.  Being an  woman.  Being a  man.  Living in an area with limited access to:  [place], recreation centers, or sidewalks.  Healthy food choices, such as grocery stores and [employer].  What are the signs or symptoms?  The main sign is having too much body fat.  How is this treated?  Treatment for this condition often includes changing your lifestyle. Treatment may include:  Changing your diet. This may include making a healthy meal plan.  Exercise. This may include activity that causes your heart to beat faster (aerobic exercise) and strength training. Work with your doctor to design a program that works for you.  Medicine to help you lose weight. This may be used if you are not able to lose 1 pound a week after 6 weeks of healthy eating and more exercise.  Treating conditions that cause the  obesity.  Surgery. Options may include gastric banding and gastric bypass. This may be done if:  Other treatments have not helped to improve your condition.  You have a BMI of 40 or higher.  You have life-threatening health problems related to obesity.  Follow these instructions at home:  Eating and drinking    Follow advice from your doctor about what to eat and drink. Your doctor may tell you to:  Limit fast food, sweets, and processed snack foods.  Choose low-fat options. For example, choose low-fat milk instead of whole milk.  Eat 5 or more servings of fruits or vegetables each day.  Eat at home more often. This gives you more control over what you eat.  Choose healthy foods when you eat out.  Learn to read food labels. This will help you learn how much food is in 1 serving.  Keep low-fat snacks available.  Avoid drinks that have a lot of sugar in them. These include soda, fruit juice, iced tea with sugar, and flavored milk.  Drink enough water to keep your pee (urine) pale yellow.  Do not go on fad diets.    Physical activity  Exercise often, as told by your doctor. Most adults should get up to 150 minutes of moderate-intensity exercise every week.Ask your doctor:  What types of exercise are safe for you.  How often you should exercise.  Warm up and stretch before being active.  Do slow stretching after being active (cool down).  Rest between times of being active.  Lifestyle  Work with your doctor and a food expert (dietitian) to set a weight-loss goal that is best for you.  Limit your screen time.  Find ways to reward yourself that do not involve food.  Do not drink alcohol if:  Your doctor tells you not to drink.  You are pregnant, may be pregnant, or are planning to become pregnant.  If you drink alcohol:  Limit how much you use to:  0-1 drink a day for women.  0-2 drinks a day for men.  Be aware of how much alcohol is in your drink. In the [place] one drink equals one 12 oz bottle of beer (355 mL), one 5 oz  glass of wine (148 mL), or one 1½ oz glass of hard liquor (44 mL).  General instructions  Keep a weight-loss journal. This can help you keep track of:  The food that you eat.  How much exercise you get.  Take over-the-counter and prescription medicines only as told by your doctor.  Take vitamins and supplements only as told by your doctor.  Think about joining a support group.  Keep all follow-up visits as told by your doctor. This is important.  Contact a doctor if:  You cannot meet your weight loss goal after you have changed your diet and lifestyle for 6 weeks.  Get help right away if you:  Are having trouble breathing.  Are having thoughts of harming yourself.  Summary  Obesity is having too much body fat.  Being obese means that your weight is more than what is healthy for you.  Work with your doctor to set a weight-loss goal.  Get regular exercise as told by your doctor.  This information is not intended to replace advice given to you by your health care provider. Make sure you discuss any questions you have with your health care provider.  Document Revised: 08/22/2019 Document Reviewed: 08/22/2019  [employer] Patient Education © 2021 [employer]

## 2022-03-30 NOTE — PROGRESS NOTES
"Mode of Visit: Video  Location of patient: home  You have chosen to receive care through a telehealth visit.  Does the patient consent to use a video/audio connection for your medical care today? Yes  The visit included audio and video interaction. No technical issues occurred during this visit.     Chief Complaint  Edema (Left breast area and stomach)    Subjective          Niesha Bravo presents to [facility]  Reviewed all recent labs and medications. Pt has not had labs drawn since 2019. She has not been seen in office since 2/2020. Advised her next visit MUST be in office and she MUST come get labs done ASAP. Pt agrees and verbalizes understanding.     Pt c/o generalized edema, most pronounced in upper abd, left breast area. Denies pain, mass, N/V/D/C, rash, chest pain, or other. Pt with obesity, does c/o SOB. Requesting new nebulizer, states hers has stopped working.     Objective   Vital Signs:   Ht 167.6 cm (66\")   BMI 62.21 kg/m²     Physical Exam   Constitutional: She appears well-developed and well-nourished.   HENT:   Head: Normocephalic and atraumatic.   Eyes: Conjunctivae are normal.   Neck: Neck normal appearance.  Pulmonary/Chest: Effort normal.   Abdominal: She exhibits no distension and no visible mass. No visible hernia present.   Musculoskeletal:         General: Edema present.   Neurological: She is alert.   Skin: Skin is warm and dry.   Psychiatric: She has a normal mood and affect.   Vitals reviewed.    Result Review :                   Assessment and Plan    Diagnoses and all orders for this visit:    1. Need for hepatitis C screening test (Primary)  -     Hepatitis C Antibody; Future    2. Edema, unspecified type  Comments:  Lasix 20mg PO qd PRN   Recommend weight loss   Pt needs office visit for futher eval, consider imaging   Increase clear fluids     3. Hyperlipidemia, unspecified hyperlipidemia type  Assessment & Plan:  Lipid abnormalities are unknown, " order for labs today .  Nutritional counseling was provided. and Pharmacotherapy as ordered.  Lipids will be reassessed labs today .    Orders:  -     Lipid Panel; Future    4. Hypertension, unspecified type  Assessment & Plan:  Hypertension is unknown, advised that patient needs an inperson visit.  Dietary sodium restriction.  Weight loss.  Regular aerobic exercise.  Continue current medications.  Blood pressure will be reassessed in 2 weeks.      5. Hypothyroidism, unspecified type  Assessment & Plan:  Order for TSH   Continue levothyroxine as prescribed     Orders:  -     Comprehensive Metabolic Panel; Future  -     CBC & Differential; Future  -     TSH; Future    6. Class 3 severe obesity with serious comorbidity in adult, unspecified BMI, unspecified obesity type (HCC)  Assessment & Plan:  Patient's (Body mass index is 62.21 kg/m².) indicates that they are morbidly obese (BMI > 40 or > 35 with obesity - related health condition) with health conditions that include hypertension and dyslipidemias . Weight is unchanged. BMI is is above average; BMI management plan is completed. We discussed low calorie, low carb based diet program, portion control and increasing exercise.       7. Allergic rhinitis, unspecified seasonality, unspecified trigger  -     Home Nebulizer    8. Screening for diabetes mellitus  -     Hemoglobin A1c; Future    9. Vitamin D deficiency  -     Vitamin D 1,25 Dihydroxy; Future    10. Anemia, unspecified type  -     Iron and TIBC; Future    Other orders  -     furosemide (Lasix) 20 MG tablet; Take 1 tablet by mouth Daily As Needed (edema).  Dispense: 30 tablet; Refill: 1      Follow Up   Return in about 4 weeks (around 4/27/2022), or if symptoms worsen or fail to improve.  Patient was given instructions and counseling regarding her condition or for health maintenance advice. Please see specific information pulled into the AVS if appropriate.

## 2022-03-30 NOTE — ASSESSMENT & PLAN NOTE
Hypertension is unknown, advised that patient needs an inperson visit.  Dietary sodium restriction.  Weight loss.  Regular aerobic exercise.  Continue current medications.  Blood pressure will be reassessed in 2 weeks.

## 2022-03-30 NOTE — ASSESSMENT & PLAN NOTE
Lipid abnormalities are unknown, order for labs today .  Nutritional counseling was provided. and Pharmacotherapy as ordered.  Lipids will be reassessed labs today .

## 2022-04-04 ENCOUNTER — TELEMEDICINE (OUTPATIENT)
Dept: FAMILY MEDICINE CLINIC | Facility: CLINIC | Age: 42
End: 2022-04-04

## 2022-04-04 DIAGNOSIS — L23.2 ALLERGIC CONTACT DERMATITIS DUE TO COSMETICS: Primary | ICD-10-CM

## 2022-04-04 PROCEDURE — 99213 OFFICE O/P EST LOW 20 MIN: CPT

## 2022-04-04 RX ORDER — PREDNISONE 20 MG/1
TABLET ORAL
Qty: 8 TABLET | Refills: 0 | Status: SHIPPED | OUTPATIENT
Start: 2022-04-04 | End: 2022-04-14

## 2022-04-04 NOTE — PROGRESS NOTES
Today's encounter is being done with a telehealth visit. she has consented verbally with two witnesses for todays treatment. Todays visit is being conducted by audio and video. Individuals present during the telemedicine consultation include patient and JESUS Alejandre.    Chief Complaint   Patient presents with   • Rash     On scalp, ears and back of neck. She dyed her hair a couple of days ago, has blisters along her hairline on her forehead and ears. Thinks she is having an allergic reaction to the hair dye, has had this to happen before and was given steroids.        Subjective          Niesha Bravo presents to [facility]     She is being seen today for rash on her scalp, ears, back of neck, and forehead. She states she dyed her hair a couple days ago and ever since then she's had this rash start up. She has itching as well as blisters in her scalp.     She states this has happened one time before and had to take steroids for it. She was seen in the emergency room in [place] for this previously.       Past History:  Medical History: has a past medical history of Allergic, Hypertension, and Hyperthyroidism.   Surgical History: has no past surgical history on file.   Family History: family history is not on file.   Social History: reports that she has never smoked. She has never used smokeless tobacco. She reports that she does not drink alcohol and does not use drugs.  Allergies: Patient has no known allergies.  (Not in a hospital admission)       Social History     Socioeconomic History   • Marital status: Single   Tobacco Use   • Smoking status: Never Smoker   • Smokeless tobacco: Never Used   Vaping Use   • Vaping Use: Never used   Substance and Sexual Activity   • Alcohol use: Never   • Drug use: Never   • Sexual activity: Defer       Health Maintenance Due   Topic Date Due   • HEPATITIS C SCREENING  Never done   • LIPID PANEL  03/30/2022       Objective     Vital  Signs:   There were no vitals taken for this visit.      Physical Exam  Constitutional:       Appearance: Normal appearance.   HENT:      Nose: Nose normal.      Mouth/Throat:      Mouth: Mucous membranes are moist.   Skin:     Findings: Erythema (forehead) and rash (on forehead/scalp) present.   Neurological:      General: No focal deficit present.      Mental Status: She is alert and oriented to person, place, and time.   Psychiatric:         Mood and Affect: Mood normal.         Behavior: Behavior normal.          Review of Systems   Skin: Positive for rash.        Result Review :                 Assessment and Plan    Diagnoses and all orders for this visit:    1. Allergic contact dermatitis due to cosmetics (Primary)  -     predniSONE (DELTASONE) 20 MG tablet; Take 1 tablet by mouth Daily for 5 days, THEN 0.5 tablets Daily for 5 days.  Dispense: 8 tablet; Refill: 0    I am starting her on prednisone and tapering it down over 10 days to treat her allergic contact dermatitis.         Pt thought to be clinically stable at this time.    Follow Up   Return if symptoms worsen or fail to improve.  Patient was given instructions and counseling regarding her condition or for health maintenance advice. Please see specific information pulled into the AVS if appropriate.

## 2022-07-07 RX ORDER — ERGOCALCIFEROL 1.25 MG/1
50000 CAPSULE ORAL WEEKLY
Qty: 5 CAPSULE | Refills: 1 | Status: SHIPPED | OUTPATIENT
Start: 2022-07-07

## 2022-07-07 RX ORDER — LEVOTHYROXINE SODIUM 0.15 MG/1
150 TABLET ORAL DAILY
Qty: 90 TABLET | Refills: 1 | Status: SHIPPED | OUTPATIENT
Start: 2022-07-07 | End: 2023-03-14

## 2022-07-07 RX ORDER — CETIRIZINE HYDROCHLORIDE 10 MG/1
10 TABLET ORAL DAILY
Qty: 90 TABLET | Refills: 1 | Status: SHIPPED | OUTPATIENT
Start: 2022-07-07

## 2022-07-07 NOTE — TELEPHONE ENCOUNTER
Caller: Niesha Bravo    Relationship: Self    Best call back number: [phone]     Requested Prescriptions:   Requested Prescriptions     Pending Prescriptions Disp Refills   • cetirizine (zyrTEC) 10 MG tablet     • vitamin D (ERGOCALCIFEROL) 1.25 MG (35012 UT) capsule capsule 5 capsule      Sig: Take 1 capsule by mouth 1 (One) Time Per Week.   • levothyroxine (SYNTHROID, LEVOTHROID) 150 MCG tablet          Pharmacy where request should be sent: [address] [phone] [facility] [phone]      Additional details provided by patient: OUT OF MEDICATION     Does the patient have less than a 3 day supply:  [x] Yes  [] No    Ronal Copeland Rep   07/07/22 09:28 EDT

## 2022-09-01 RX ORDER — VALSARTAN AND HYDROCHLOROTHIAZIDE 320; 25 MG/1; MG/1
1 TABLET, FILM COATED ORAL DAILY
Qty: 90 TABLET | Refills: 1 | Status: SHIPPED | OUTPATIENT
Start: 2022-09-01 | End: 2023-03-14

## 2022-09-01 RX ORDER — LABETALOL 100 MG/1
200 TABLET, FILM COATED ORAL 2 TIMES DAILY
Qty: 180 TABLET | Refills: 1 | Status: SHIPPED | OUTPATIENT
Start: 2022-09-01 | End: 2023-03-14

## 2022-09-01 NOTE — TELEPHONE ENCOUNTER
Caller: Niesha Bravo    Relationship: Self    Best call back number: [phone]    Requested Prescriptions:   Requested Prescriptions     Pending Prescriptions Disp Refills   • labetalol (NORMODYNE) 100 MG tablet       Sig: Take 2 tablets by mouth 2 (Two) Times a Day.   • valsartan-hydrochlorothiazide (DIOVAN-HCT) 320-25 MG per tablet       Sig: Take 1 tablet by mouth Daily.        Pharmacy where request should be sent: [address] [phone] [facility] [phone]      Additional details provided by patient: PATIENT IS OUT OF MEDICATION AND REFILLS AND NEEDS THESE IMMEDIATELY.     Does the patient have less than a 3 day supply:  [x] Yes  [] No    Ronal Dolan Rep   09/01/22 09:48 EDT

## 2023-03-13 NOTE — TELEPHONE ENCOUNTER
Relationship: PATIENT    What was the call regarding: PATIENT STATES SHE IS OUT OF THESE MEDICATIONS.

## 2023-03-14 RX ORDER — LEVOTHYROXINE SODIUM 0.15 MG/1
TABLET ORAL
Qty: 90 TABLET | Refills: 0 | Status: SHIPPED | OUTPATIENT
Start: 2023-03-14

## 2023-03-14 RX ORDER — VALSARTAN AND HYDROCHLOROTHIAZIDE 320; 25 MG/1; MG/1
TABLET, FILM COATED ORAL
Qty: 90 TABLET | Refills: 0 | Status: SHIPPED | OUTPATIENT
Start: 2023-03-14

## 2023-03-14 RX ORDER — LABETALOL 100 MG/1
TABLET, FILM COATED ORAL
Qty: 180 TABLET | Refills: 0 | Status: SHIPPED | OUTPATIENT
Start: 2023-03-14

## 2023-03-14 NOTE — TELEPHONE ENCOUNTER
10 day supply of meds sent to pharmacy, patient needs to keep her appointment with PCP for more refill.

## 2023-06-14 ENCOUNTER — TELEPHONE (OUTPATIENT)
Dept: FAMILY MEDICINE CLINIC | Facility: CLINIC | Age: 43
End: 2023-06-14
Payer: COMMERCIAL

## 2023-06-14 NOTE — TELEPHONE ENCOUNTER
Caller: Niesha Bravo    Relationship: Self    Best call back number: [phone]    What form or medical record are you requesting: MEDICAL RECORDS    Who is requesting this form or medical record from you: NEW PROVIDER     How would you like to receive the form or medical records (pick-up, mail, fax):   If fax, what is the fax number:   If mail, what is the address: [address] [place], [place] [id number]  If pick-up, provide patient with address and location details    Timeframe paperwork needed:ASAP    Additional notes: PLEASE LET PATIENT KNOW WHEN THIS HAS BEEN SENT IN THE MAIL

## 2023-06-15 NOTE — TELEPHONE ENCOUNTER
Let patient know we would need a records release form filled out and signed so we can fax records to next office. She is unable to come to our office to fill this out. I let her know when she has an appointment at new provider office, they should have similar forms and they can fax to our medical records     Patient verbalized understanding